# Patient Record
Sex: FEMALE | Race: WHITE | NOT HISPANIC OR LATINO | URBAN - METROPOLITAN AREA
[De-identification: names, ages, dates, MRNs, and addresses within clinical notes are randomized per-mention and may not be internally consistent; named-entity substitution may affect disease eponyms.]

---

## 2017-01-10 ENCOUNTER — FOLLOW UP ESTABLISHED (OUTPATIENT)
Dept: URBAN - METROPOLITAN AREA CLINIC 51 | Facility: CLINIC | Age: 47
End: 2017-01-10
Payer: COMMERCIAL

## 2017-01-10 DIAGNOSIS — H35.342 MACULAR CYST, HOLE, OR PSEUDOHOLE, LEFT EYE: ICD-10-CM

## 2017-01-10 DIAGNOSIS — H25.042 POSTERIOR SUBCAPSULAR POLAR AGE-RELATED CATARACT, LEFT EYE: ICD-10-CM

## 2017-01-10 DIAGNOSIS — H43.822 VITREOMACULAR ADHESION, LEFT EYE: ICD-10-CM

## 2017-01-10 PROCEDURE — 92014 COMPRE OPH EXAM EST PT 1/>: CPT | Performed by: OPTOMETRIST

## 2017-01-10 PROCEDURE — 92134 CPTRZ OPH DX IMG PST SGM RTA: CPT | Performed by: OPTOMETRIST

## 2017-01-10 PROCEDURE — 92133 CPTRZD OPH DX IMG PST SGM ON: CPT | Performed by: OPTOMETRIST

## 2017-01-10 ASSESSMENT — VISUAL ACUITY
OS: 20/40
OD: 20/40

## 2017-01-10 ASSESSMENT — INTRAOCULAR PRESSURE
OD: 16
OS: 14

## 2017-01-10 ASSESSMENT — KERATOMETRY
OS: 44.84
OD: 41.91

## 2017-01-11 ENCOUNTER — Encounter (OUTPATIENT)
Dept: URBAN - METROPOLITAN AREA CLINIC 51 | Facility: CLINIC | Age: 47
End: 2017-01-11
Payer: COMMERCIAL

## 2017-01-11 PROCEDURE — 92083 EXTENDED VISUAL FIELD XM: CPT | Performed by: OPTOMETRIST

## 2017-01-12 ENCOUNTER — FOLLOW UP ESTABLISHED (OUTPATIENT)
Dept: URBAN - METROPOLITAN AREA CLINIC 51 | Facility: CLINIC | Age: 47
End: 2017-01-12
Payer: COMMERCIAL

## 2017-01-12 DIAGNOSIS — H35.413 LATTICE DEGENERATION OF RETINA, BILATERAL: ICD-10-CM

## 2017-01-12 PROCEDURE — 92134 CPTRZ OPH DX IMG PST SGM RTA: CPT | Performed by: OPHTHALMOLOGY

## 2017-01-12 PROCEDURE — 92014 COMPRE OPH EXAM EST PT 1/>: CPT | Performed by: OPHTHALMOLOGY

## 2017-01-12 ASSESSMENT — INTRAOCULAR PRESSURE
OD: 15
OS: 14

## 2017-01-25 ENCOUNTER — Encounter (OUTPATIENT)
Dept: URBAN - METROPOLITAN AREA CLINIC 51 | Facility: CLINIC | Age: 47
End: 2017-01-25
Payer: COMMERCIAL

## 2017-01-25 DIAGNOSIS — H25.12 AGE-RELATED NUCLEAR CATARACT, LEFT EYE: Primary | ICD-10-CM

## 2017-01-25 PROCEDURE — 92025 CPTRIZED CORNEAL TOPOGRAPHY: CPT | Performed by: OPHTHALMOLOGY

## 2017-03-01 ENCOUNTER — FOLLOW UP ESTABLISHED (OUTPATIENT)
Dept: URBAN - METROPOLITAN AREA CLINIC 44 | Facility: CLINIC | Age: 47
End: 2017-03-01
Payer: COMMERCIAL

## 2017-03-01 DIAGNOSIS — H25.813 COMBINED FORMS OF AGE-RELATED CATARACT, BILATERAL: Primary | ICD-10-CM

## 2017-03-01 PROCEDURE — 99213 OFFICE O/P EST LOW 20 MIN: CPT | Performed by: OPHTHALMOLOGY

## 2017-03-01 RX ORDER — DICLOFENAC SODIUM 1 MG/ML
0.1 % SOLUTION/ DROPS OPHTHALMIC
Qty: 2 | Refills: 0 | Status: INACTIVE
Start: 2017-03-01 | End: 2017-06-22

## 2017-03-01 RX ORDER — PREDNISOLONE ACETATE 10 MG/ML
1 % SUSPENSION/ DROPS OPHTHALMIC
Qty: 1 | Refills: 2 | Status: INACTIVE
Start: 2017-03-01 | End: 2017-06-22

## 2017-03-01 RX ORDER — OFLOXACIN 3 MG/ML
0.3 % SOLUTION/ DROPS OPHTHALMIC
Qty: 1 | Refills: 0 | Status: INACTIVE
Start: 2017-03-01 | End: 2017-03-30

## 2017-03-01 ASSESSMENT — INTRAOCULAR PRESSURE
OD: 12
OS: 13

## 2017-03-08 ENCOUNTER — Encounter (OUTPATIENT)
Dept: URBAN - METROPOLITAN AREA CLINIC 51 | Facility: CLINIC | Age: 47
End: 2017-03-08
Payer: COMMERCIAL

## 2017-03-08 DIAGNOSIS — Z01.818 ENCOUNTER FOR OTHER PREPROCEDURAL EXAMINATION: Primary | ICD-10-CM

## 2017-03-08 PROCEDURE — 99213 OFFICE O/P EST LOW 20 MIN: CPT | Performed by: PHYSICIAN ASSISTANT

## 2017-03-22 ENCOUNTER — SURGERY (OUTPATIENT)
Dept: URBAN - METROPOLITAN AREA SURGERY 19 | Facility: SURGERY | Age: 47
End: 2017-03-22
Payer: COMMERCIAL

## 2017-03-22 PROCEDURE — 66984 XCAPSL CTRC RMVL W/O ECP: CPT | Performed by: OPHTHALMOLOGY

## 2017-03-22 PROCEDURE — 65920 REMOVE IMPLANT OF EYE: CPT | Performed by: OPHTHALMOLOGY

## 2017-03-23 ENCOUNTER — POST OP (OUTPATIENT)
Dept: URBAN - METROPOLITAN AREA CLINIC 51 | Facility: CLINIC | Age: 47
End: 2017-03-23

## 2017-03-23 PROCEDURE — 99024 POSTOP FOLLOW-UP VISIT: CPT | Performed by: OPTOMETRIST

## 2017-03-23 ASSESSMENT — INTRAOCULAR PRESSURE: OS: 15

## 2017-03-30 ENCOUNTER — POST OP (OUTPATIENT)
Dept: URBAN - METROPOLITAN AREA CLINIC 51 | Facility: CLINIC | Age: 47
End: 2017-03-30

## 2017-03-30 DIAGNOSIS — Z96.1 PRESENCE OF INTRAOCULAR LENS: Primary | ICD-10-CM

## 2017-03-30 PROCEDURE — 99024 POSTOP FOLLOW-UP VISIT: CPT | Performed by: OPTOMETRIST

## 2017-03-30 RX ORDER — TIMOLOL MALEATE 5 MG/ML
0.5 % SOLUTION/ DROPS OPHTHALMIC
Qty: 15 | Refills: 2 | Status: INACTIVE
Start: 2017-03-30 | End: 2017-04-19

## 2017-03-30 ASSESSMENT — INTRAOCULAR PRESSURE
OD: 12
OS: 15

## 2017-03-30 ASSESSMENT — VISUAL ACUITY
OD: 20/30
OS: 20/20

## 2017-04-19 ENCOUNTER — POST OP (OUTPATIENT)
Dept: URBAN - METROPOLITAN AREA CLINIC 51 | Facility: CLINIC | Age: 47
End: 2017-04-19
Payer: COMMERCIAL

## 2017-04-19 PROCEDURE — 99024 POSTOP FOLLOW-UP VISIT: CPT | Performed by: OPTOMETRIST

## 2017-04-19 RX ORDER — TIMOLOL MALEATE 5 MG/ML
0.5 % SOLUTION/ DROPS OPHTHALMIC
Qty: 15 | Refills: 2 | Status: INACTIVE
Start: 2017-04-19 | End: 2017-06-22

## 2017-04-19 ASSESSMENT — INTRAOCULAR PRESSURE
OS: 20
OD: 19

## 2017-04-19 ASSESSMENT — VISUAL ACUITY
OD: 20/30
OS: 20/20

## 2017-06-22 ENCOUNTER — FOLLOW UP ESTABLISHED (OUTPATIENT)
Dept: URBAN - METROPOLITAN AREA CLINIC 51 | Facility: CLINIC | Age: 47
End: 2017-06-22
Payer: COMMERCIAL

## 2017-06-22 DIAGNOSIS — H35.372 PUCKERING OF MACULA, LEFT EYE: ICD-10-CM

## 2017-06-22 DIAGNOSIS — H26.492 OTHER SECONDARY CATARACT, LEFT EYE: Primary | ICD-10-CM

## 2017-06-22 PROCEDURE — 92014 COMPRE OPH EXAM EST PT 1/>: CPT | Performed by: OPTOMETRIST

## 2017-06-22 PROCEDURE — 92134 CPTRZ OPH DX IMG PST SGM RTA: CPT | Performed by: OPTOMETRIST

## 2017-06-22 RX ORDER — TIMOLOL MALEATE 5 MG/ML
0.5 % SOLUTION/ DROPS OPHTHALMIC
Qty: 15 | Refills: 2 | Status: INACTIVE
Start: 2017-06-22 | End: 2017-09-05

## 2017-06-22 ASSESSMENT — VISUAL ACUITY
OD: 20/30
OS: 20/20

## 2017-06-22 ASSESSMENT — INTRAOCULAR PRESSURE
OD: 21
OS: 16

## 2017-07-19 ENCOUNTER — Encounter (OUTPATIENT)
Dept: URBAN - METROPOLITAN AREA CLINIC 51 | Facility: CLINIC | Age: 47
End: 2017-07-19
Payer: COMMERCIAL

## 2017-07-19 PROCEDURE — 99213 OFFICE O/P EST LOW 20 MIN: CPT | Performed by: PHYSICIAN ASSISTANT

## 2017-07-26 ENCOUNTER — SURGERY (OUTPATIENT)
Dept: URBAN - METROPOLITAN AREA SURGERY 19 | Facility: SURGERY | Age: 47
End: 2017-07-26
Payer: COMMERCIAL

## 2017-07-26 PROCEDURE — 66821 AFTER CATARACT LASER SURGERY: CPT | Performed by: OPHTHALMOLOGY

## 2017-07-27 ENCOUNTER — POST OP (OUTPATIENT)
Dept: URBAN - METROPOLITAN AREA CLINIC 51 | Facility: CLINIC | Age: 47
End: 2017-07-27
Payer: COMMERCIAL

## 2017-07-27 PROCEDURE — 99024 POSTOP FOLLOW-UP VISIT: CPT | Performed by: OPTOMETRIST

## 2017-07-27 ASSESSMENT — INTRAOCULAR PRESSURE
OD: 17
OS: 11

## 2018-09-06 ENCOUNTER — CONSULT (OUTPATIENT)
Dept: URBAN - METROPOLITAN AREA CLINIC 51 | Facility: CLINIC | Age: 48
End: 2018-09-06
Payer: COMMERCIAL

## 2018-09-06 DIAGNOSIS — D48.5 NEOPLASM OF UNCERTIAN BEHAVIOR OF SKIN: Primary | ICD-10-CM

## 2018-09-06 PROCEDURE — 11101 BX SKIN/SUBQ TISS (SEP PRO); E: CPT | Performed by: OPHTHALMOLOGY

## 2018-09-06 PROCEDURE — 99202 OFFICE O/P NEW SF 15 MIN: CPT | Performed by: OPHTHALMOLOGY

## 2018-09-06 PROCEDURE — 92285 EXTERNAL OCULAR PHOTOGRAPHY: CPT | Performed by: OPHTHALMOLOGY

## 2018-09-06 PROCEDURE — 11100 SKIN BIOPSY: CPT | Performed by: OPHTHALMOLOGY

## 2018-09-06 PROCEDURE — 99212 OFFICE O/P EST SF 10 MIN: CPT | Performed by: OPHTHALMOLOGY

## 2018-09-21 ENCOUNTER — FOLLOW UP ESTABLISHED (OUTPATIENT)
Dept: URBAN - METROPOLITAN AREA CLINIC 51 | Facility: CLINIC | Age: 48
End: 2018-09-21
Payer: COMMERCIAL

## 2018-09-21 PROCEDURE — 92014 COMPRE OPH EXAM EST PT 1/>: CPT | Performed by: OPTOMETRIST

## 2018-09-21 PROCEDURE — 92134 CPTRZ OPH DX IMG PST SGM RTA: CPT | Performed by: OPTOMETRIST

## 2018-09-21 RX ORDER — TIMOLOL MALEATE 5 MG/ML
0.5 % SOLUTION/ DROPS OPHTHALMIC
Qty: 3 | Refills: 3 | Status: INACTIVE
Start: 2018-09-21 | End: 2018-11-19

## 2018-09-21 RX ORDER — DOXYCYCLINE HYCLATE 50 MG/1
50 MG CAPSULE, GELATIN COATED ORAL
Qty: 30 | Refills: 2 | Status: INACTIVE
Start: 2018-09-21 | End: 2019-05-09

## 2018-09-21 ASSESSMENT — VISUAL ACUITY
OS: 20/20
OD: 20/30

## 2018-09-21 ASSESSMENT — INTRAOCULAR PRESSURE
OS: 19
OD: 23
OS: 18
OD: 22

## 2018-11-01 ENCOUNTER — FOLLOW UP ESTABLISHED (OUTPATIENT)
Dept: URBAN - METROPOLITAN AREA CLINIC 51 | Facility: CLINIC | Age: 48
End: 2018-11-01
Payer: COMMERCIAL

## 2018-11-01 PROCEDURE — 92012 INTRM OPH EXAM EST PATIENT: CPT | Performed by: OPTOMETRIST

## 2018-11-01 ASSESSMENT — INTRAOCULAR PRESSURE
OS: 17
OD: 20

## 2018-11-29 ENCOUNTER — FOLLOW UP ESTABLISHED (OUTPATIENT)
Dept: URBAN - METROPOLITAN AREA CLINIC 51 | Facility: CLINIC | Age: 48
End: 2018-11-29
Payer: COMMERCIAL

## 2018-11-29 PROCEDURE — 99213 OFFICE O/P EST LOW 20 MIN: CPT | Performed by: OPTOMETRIST

## 2019-02-20 ENCOUNTER — APPOINTMENT (RX ONLY)
Dept: URBAN - METROPOLITAN AREA CLINIC 176 | Facility: CLINIC | Age: 49
Setting detail: DERMATOLOGY
End: 2019-02-20

## 2019-02-20 DIAGNOSIS — L91.8 OTHER HYPERTROPHIC DISORDERS OF THE SKIN: ICD-10-CM

## 2019-02-20 DIAGNOSIS — Z80.8 FAMILY HISTORY OF MALIGNANT NEOPLASM OF OTHER ORGANS OR SYSTEMS: ICD-10-CM

## 2019-02-20 DIAGNOSIS — L29.89 OTHER PRURITUS: ICD-10-CM

## 2019-02-20 DIAGNOSIS — L85.3 XEROSIS CUTIS: ICD-10-CM

## 2019-02-20 DIAGNOSIS — L82.1 OTHER SEBORRHEIC KERATOSIS: ICD-10-CM

## 2019-02-20 DIAGNOSIS — L57.0 ACTINIC KERATOSIS: ICD-10-CM

## 2019-02-20 DIAGNOSIS — L30.1 DYSHIDROSIS [POMPHOLYX]: ICD-10-CM

## 2019-02-20 DIAGNOSIS — L57.8 OTHER SKIN CHANGES DUE TO CHRONIC EXPOSURE TO NONIONIZING RADIATION: ICD-10-CM

## 2019-02-20 DIAGNOSIS — D18.0 HEMANGIOMA: ICD-10-CM

## 2019-02-20 DIAGNOSIS — D22 MELANOCYTIC NEVI: ICD-10-CM

## 2019-02-20 DIAGNOSIS — L29.8 OTHER PRURITUS: ICD-10-CM

## 2019-02-20 DIAGNOSIS — L20.89 OTHER ATOPIC DERMATITIS: ICD-10-CM

## 2019-02-20 PROBLEM — L20.84 INTRINSIC (ALLERGIC) ECZEMA: Status: ACTIVE | Noted: 2019-02-20

## 2019-02-20 PROBLEM — D22.62 MELANOCYTIC NEVI OF LEFT UPPER LIMB, INCLUDING SHOULDER: Status: ACTIVE | Noted: 2019-02-20

## 2019-02-20 PROBLEM — D22.5 MELANOCYTIC NEVI OF TRUNK: Status: ACTIVE | Noted: 2019-02-20

## 2019-02-20 PROBLEM — D18.01 HEMANGIOMA OF SKIN AND SUBCUTANEOUS TISSUE: Status: ACTIVE | Noted: 2019-02-20

## 2019-02-20 PROBLEM — E03.9 HYPOTHYROIDISM, UNSPECIFIED: Status: ACTIVE | Noted: 2019-02-20

## 2019-02-20 PROBLEM — D22.72 MELANOCYTIC NEVI OF LEFT LOWER LIMB, INCLUDING HIP: Status: ACTIVE | Noted: 2019-02-20

## 2019-02-20 PROCEDURE — ? PRESCRIPTION SAMPLES PROVIDED

## 2019-02-20 PROCEDURE — ? ADDITIONAL NOTES

## 2019-02-20 PROCEDURE — ? LIQUID NITROGEN

## 2019-02-20 PROCEDURE — 99214 OFFICE O/P EST MOD 30 MIN: CPT | Mod: 25

## 2019-02-20 PROCEDURE — 17000 DESTRUCT PREMALG LESION: CPT

## 2019-02-20 PROCEDURE — 17003 DESTRUCT PREMALG LES 2-14: CPT

## 2019-02-20 PROCEDURE — ? PRESCRIPTION

## 2019-02-20 PROCEDURE — ? COUNSELING

## 2019-02-20 RX ORDER — TRIAMCINOLONE ACETONIDE 1 MG/G
CREAM TOPICAL
Qty: 2 | Refills: 3 | Status: ERX | COMMUNITY
Start: 2019-02-20

## 2019-02-20 RX ADMIN — TRIAMCINOLONE ACETONIDE 1: 1 CREAM TOPICAL at 00:00

## 2019-02-20 ASSESSMENT — LOCATION SIMPLE DESCRIPTION DERM
LOCATION SIMPLE: LEFT THIGH
LOCATION SIMPLE: LEFT ANKLE
LOCATION SIMPLE: LEFT FOREHEAD
LOCATION SIMPLE: RIGHT UPPER ARM
LOCATION SIMPLE: LEFT CLAVICULAR SKIN
LOCATION SIMPLE: LEFT SHOULDER
LOCATION SIMPLE: LEFT UPPER ARM
LOCATION SIMPLE: RIGHT HAND
LOCATION SIMPLE: LEFT ANTERIOR NECK
LOCATION SIMPLE: CHEST
LOCATION SIMPLE: LEFT HAND
LOCATION SIMPLE: UPPER BACK
LOCATION SIMPLE: RIGHT UPPER BACK
LOCATION SIMPLE: RIGHT FOREARM
LOCATION SIMPLE: RIGHT ANTERIOR NECK
LOCATION SIMPLE: ABDOMEN
LOCATION SIMPLE: RIGHT CLAVICULAR SKIN

## 2019-02-20 ASSESSMENT — LOCATION ZONE DERM
LOCATION ZONE: LEG
LOCATION ZONE: NECK
LOCATION ZONE: ARM
LOCATION ZONE: HAND
LOCATION ZONE: TRUNK
LOCATION ZONE: FACE

## 2019-02-20 ASSESSMENT — LOCATION DETAILED DESCRIPTION DERM
LOCATION DETAILED: LEFT RADIAL DORSAL HAND
LOCATION DETAILED: LEFT ANKLE
LOCATION DETAILED: RIGHT CLAVICULAR NECK
LOCATION DETAILED: LEFT CLAVICULAR SKIN
LOCATION DETAILED: LEFT ANTERIOR PROXIMAL THIGH
LOCATION DETAILED: MIDDLE STERNUM
LOCATION DETAILED: LEFT SUPERIOR FOREHEAD
LOCATION DETAILED: LEFT ANTERIOR PROXIMAL UPPER ARM
LOCATION DETAILED: RIGHT RADIAL DORSAL HAND
LOCATION DETAILED: RIGHT SUPERIOR MEDIAL UPPER BACK
LOCATION DETAILED: RIGHT LATERAL ABDOMEN
LOCATION DETAILED: RIGHT VENTRAL PROXIMAL FOREARM
LOCATION DETAILED: RIGHT ANTECUBITAL SKIN
LOCATION DETAILED: LEFT CLAVICULAR NECK
LOCATION DETAILED: LEFT POSTERIOR SHOULDER
LOCATION DETAILED: LEFT RIB CAGE
LOCATION DETAILED: SUPERIOR THORACIC SPINE
LOCATION DETAILED: RIGHT CLAVICULAR SKIN
LOCATION DETAILED: LEFT ANTERIOR DISTAL UPPER ARM

## 2019-02-20 NOTE — PROCEDURE: ADDITIONAL NOTES
Detail Level: Detailed
Additional Notes: Use Triamcinolone when area starts to itch (has used betamethasone in past from TM).

## 2019-05-09 ENCOUNTER — FOLLOW UP ESTABLISHED (OUTPATIENT)
Dept: URBAN - METROPOLITAN AREA CLINIC 51 | Facility: CLINIC | Age: 49
End: 2019-05-09
Payer: COMMERCIAL

## 2019-05-09 PROCEDURE — 92012 INTRM OPH EXAM EST PATIENT: CPT | Performed by: OPTOMETRIST

## 2019-05-09 RX ORDER — TIMOLOL MALEATE 5 MG/ML
0.5 % SOLUTION/ DROPS OPHTHALMIC
Qty: 3 | Refills: 3 | Status: INACTIVE
Start: 2019-05-09 | End: 2019-09-17

## 2019-05-09 ASSESSMENT — INTRAOCULAR PRESSURE
OD: 18
OS: 17

## 2019-09-06 ENCOUNTER — FOLLOW UP ESTABLISHED (OUTPATIENT)
Dept: URBAN - METROPOLITAN AREA CLINIC 51 | Facility: CLINIC | Age: 49
End: 2019-09-06
Payer: COMMERCIAL

## 2019-09-06 PROCEDURE — 92014 COMPRE OPH EXAM EST PT 1/>: CPT | Performed by: OPTOMETRIST

## 2019-09-06 PROCEDURE — 92133 CPTRZD OPH DX IMG PST SGM ON: CPT | Performed by: OPTOMETRIST

## 2019-09-06 RX ORDER — RANITIDINE HCL 150 MG
150 MG TABLET ORAL
Qty: 0 | Refills: 0 | Status: ACTIVE
Start: 2019-09-06

## 2019-09-06 ASSESSMENT — INTRAOCULAR PRESSURE
OD: 20
OS: 17

## 2019-09-06 ASSESSMENT — VISUAL ACUITY
OS: 20/20
OD: 20/30

## 2019-09-17 ENCOUNTER — FOLLOW UP ESTABLISHED (OUTPATIENT)
Dept: URBAN - METROPOLITAN AREA CLINIC 51 | Facility: CLINIC | Age: 49
End: 2019-09-17
Payer: COMMERCIAL

## 2019-09-17 DIAGNOSIS — H40.011 OPEN ANGLE WITH BORDERLINE FINDINGS, LOW RISK, RIGHT EYE: Primary | ICD-10-CM

## 2019-09-17 PROCEDURE — 92012 INTRM OPH EXAM EST PATIENT: CPT | Performed by: OPTOMETRIST

## 2019-09-17 RX ORDER — TIMOLOL MALEATE 5 MG/ML
0.5 % SOLUTION/ DROPS OPHTHALMIC
Qty: 3 | Refills: 3 | Status: INACTIVE
Start: 2019-09-17 | End: 2020-02-10

## 2019-09-17 ASSESSMENT — INTRAOCULAR PRESSURE
OS: 18
OD: 19

## 2020-02-20 ENCOUNTER — APPOINTMENT (RX ONLY)
Dept: URBAN - METROPOLITAN AREA CLINIC 176 | Facility: CLINIC | Age: 50
Setting detail: DERMATOLOGY
End: 2020-02-20

## 2020-02-20 VITALS — HEIGHT: 67 IN | WEIGHT: 204 LBS

## 2020-02-20 DIAGNOSIS — L82.1 OTHER SEBORRHEIC KERATOSIS: ICD-10-CM

## 2020-02-20 DIAGNOSIS — L85.3 XEROSIS CUTIS: ICD-10-CM

## 2020-02-20 DIAGNOSIS — L57.8 OTHER SKIN CHANGES DUE TO CHRONIC EXPOSURE TO NONIONIZING RADIATION: ICD-10-CM

## 2020-02-20 DIAGNOSIS — D18.0 HEMANGIOMA: ICD-10-CM

## 2020-02-20 DIAGNOSIS — L91.8 OTHER HYPERTROPHIC DISORDERS OF THE SKIN: ICD-10-CM

## 2020-02-20 DIAGNOSIS — L30.1 DYSHIDROSIS [POMPHOLYX]: ICD-10-CM

## 2020-02-20 DIAGNOSIS — L20.89 OTHER ATOPIC DERMATITIS: ICD-10-CM

## 2020-02-20 DIAGNOSIS — Z80.8 FAMILY HISTORY OF MALIGNANT NEOPLASM OF OTHER ORGANS OR SYSTEMS: ICD-10-CM

## 2020-02-20 DIAGNOSIS — L71.8 OTHER ROSACEA: ICD-10-CM | Status: INADEQUATELY CONTROLLED

## 2020-02-20 DIAGNOSIS — D22 MELANOCYTIC NEVI: ICD-10-CM

## 2020-02-20 PROBLEM — D18.01 HEMANGIOMA OF SKIN AND SUBCUTANEOUS TISSUE: Status: ACTIVE | Noted: 2020-02-20

## 2020-02-20 PROBLEM — D22.72 MELANOCYTIC NEVI OF LEFT LOWER LIMB, INCLUDING HIP: Status: ACTIVE | Noted: 2020-02-20

## 2020-02-20 PROBLEM — L20.84 INTRINSIC (ALLERGIC) ECZEMA: Status: ACTIVE | Noted: 2020-02-20

## 2020-02-20 PROBLEM — D22.62 MELANOCYTIC NEVI OF LEFT UPPER LIMB, INCLUDING SHOULDER: Status: ACTIVE | Noted: 2020-02-20

## 2020-02-20 PROBLEM — D22.5 MELANOCYTIC NEVI OF TRUNK: Status: ACTIVE | Noted: 2020-02-20

## 2020-02-20 PROBLEM — D22.71 MELANOCYTIC NEVI OF RIGHT LOWER LIMB, INCLUDING HIP: Status: ACTIVE | Noted: 2020-02-20

## 2020-02-20 PROBLEM — D23.72 OTHER BENIGN NEOPLASM OF SKIN OF LEFT LOWER LIMB, INCLUDING HIP: Status: ACTIVE | Noted: 2020-02-20

## 2020-02-20 PROCEDURE — 99214 OFFICE O/P EST MOD 30 MIN: CPT

## 2020-02-20 PROCEDURE — ? COUNSELING

## 2020-02-20 PROCEDURE — ? PRESCRIPTION

## 2020-02-20 PROCEDURE — ? TREATMENT REGIMEN

## 2020-02-20 RX ORDER — IVERMECTIN 10 MG/G
CREAM TOPICAL
Qty: 1 | Refills: 2 | Status: ERX | COMMUNITY
Start: 2020-02-20

## 2020-02-20 RX ADMIN — IVERMECTIN: 10 CREAM TOPICAL at 00:00

## 2020-02-20 ASSESSMENT — LOCATION SIMPLE DESCRIPTION DERM
LOCATION SIMPLE: LEFT POSTERIOR THIGH
LOCATION SIMPLE: RIGHT HAND
LOCATION SIMPLE: LEFT UPPER ARM
LOCATION SIMPLE: RIGHT CLAVICULAR SKIN
LOCATION SIMPLE: RIGHT FOREARM
LOCATION SIMPLE: LEFT CHEEK
LOCATION SIMPLE: LEFT CLAVICULAR SKIN
LOCATION SIMPLE: RIGHT ANTERIOR NECK
LOCATION SIMPLE: LEFT LOWER BACK
LOCATION SIMPLE: LEFT SHOULDER
LOCATION SIMPLE: RIGHT POSTERIOR THIGH
LOCATION SIMPLE: CHEST
LOCATION SIMPLE: ABDOMEN
LOCATION SIMPLE: LEFT HAND
LOCATION SIMPLE: LEFT ANTERIOR NECK
LOCATION SIMPLE: LEFT ANKLE
LOCATION SIMPLE: UPPER BACK
LOCATION SIMPLE: LEFT THIGH

## 2020-02-20 ASSESSMENT — LOCATION ZONE DERM
LOCATION ZONE: LEG
LOCATION ZONE: NECK
LOCATION ZONE: FACE
LOCATION ZONE: HAND
LOCATION ZONE: ARM
LOCATION ZONE: TRUNK

## 2020-02-20 ASSESSMENT — LOCATION DETAILED DESCRIPTION DERM
LOCATION DETAILED: LEFT ANTERIOR DISTAL UPPER ARM
LOCATION DETAILED: LEFT CENTRAL MALAR CHEEK
LOCATION DETAILED: LEFT CLAVICULAR SKIN
LOCATION DETAILED: RIGHT VENTRAL PROXIMAL FOREARM
LOCATION DETAILED: LEFT RADIAL DORSAL HAND
LOCATION DETAILED: LEFT DISTAL POSTERIOR THIGH
LOCATION DETAILED: RIGHT DISTAL POSTERIOR THIGH
LOCATION DETAILED: SUPERIOR THORACIC SPINE
LOCATION DETAILED: RIGHT CLAVICULAR NECK
LOCATION DETAILED: RIGHT LATERAL ABDOMEN
LOCATION DETAILED: LEFT ANTERIOR PROXIMAL UPPER ARM
LOCATION DETAILED: RIGHT CLAVICULAR SKIN
LOCATION DETAILED: LEFT RIB CAGE
LOCATION DETAILED: LEFT POSTERIOR SHOULDER
LOCATION DETAILED: LEFT ANTERIOR PROXIMAL THIGH
LOCATION DETAILED: MIDDLE STERNUM
LOCATION DETAILED: LEFT CLAVICULAR NECK
LOCATION DETAILED: RIGHT RADIAL DORSAL HAND
LOCATION DETAILED: LEFT SUPERIOR MEDIAL MIDBACK
LOCATION DETAILED: LEFT ANKLE

## 2020-02-20 ASSESSMENT — SEVERITY ASSESSMENT OVERALL AMONG ALL PATIENTS: IN YOUR EXPERIENCE, AMONG ALL PATIENTS YOU HAVE SEEN WITH THIS CONDITION, HOW SEVERE IS THIS PATIENT'S CONDITION?: MILD

## 2020-02-20 NOTE — PROCEDURE: TREATMENT REGIMEN
Detail Level: Zone
Plan: Patient being treated by her doctor she works with - she takes doxy as needed
Samples Given: Soolantra apply to face qam

## 2020-02-20 NOTE — PROCEDURE: MIPS QUALITY
Detail Level: Detailed
Quality 110: Preventive Care And Screening: Influenza Immunization: Influenza Immunization Administered during Influenza season
Quality 130: Documentation Of Current Medications In The Medical Record: Current Medications Documented
Quality 474: Zoster Vaccination Status: Shingrix Vaccination not Administered or Previously Received, Reason not Otherwise Specified

## 2020-05-01 ENCOUNTER — FOLLOW UP ESTABLISHED (OUTPATIENT)
Dept: URBAN - METROPOLITAN AREA CLINIC 51 | Facility: CLINIC | Age: 50
End: 2020-05-01
Payer: COMMERCIAL

## 2020-05-01 PROCEDURE — 92012 INTRM OPH EXAM EST PATIENT: CPT | Performed by: OPTOMETRIST

## 2020-05-04 ENCOUNTER — FOLLOW UP ESTABLISHED (OUTPATIENT)
Dept: URBAN - METROPOLITAN AREA CLINIC 51 | Facility: CLINIC | Age: 50
End: 2020-05-04
Payer: COMMERCIAL

## 2020-05-04 DIAGNOSIS — S05.02XA INJ CONJUNCTIVA AND CORNEAL ABRASION W/O FB, LEFT EYE, INIT: Primary | ICD-10-CM

## 2020-05-04 PROCEDURE — 92012 INTRM OPH EXAM EST PATIENT: CPT | Performed by: OPTOMETRIST

## 2020-07-31 ENCOUNTER — FOLLOW UP ESTABLISHED (OUTPATIENT)
Dept: URBAN - METROPOLITAN AREA CLINIC 51 | Facility: CLINIC | Age: 50
End: 2020-07-31
Payer: COMMERCIAL

## 2020-07-31 DIAGNOSIS — H52.4 PRESBYOPIA: ICD-10-CM

## 2020-07-31 DIAGNOSIS — Z98.890 OTHER SPECIFIED POSTPROCEDURAL STATES: ICD-10-CM

## 2020-07-31 DIAGNOSIS — H04.123 DRY EYE SYNDROME OF BILATERAL LACRIMAL GLANDS: ICD-10-CM

## 2020-07-31 PROCEDURE — 92014 COMPRE OPH EXAM EST PT 1/>: CPT | Performed by: OPTOMETRIST

## 2020-07-31 PROCEDURE — 92134 CPTRZ OPH DX IMG PST SGM RTA: CPT | Performed by: OPTOMETRIST

## 2020-07-31 PROCEDURE — 92015 DETERMINE REFRACTIVE STATE: CPT | Performed by: OPTOMETRIST

## 2020-07-31 ASSESSMENT — VISUAL ACUITY
OS: 20/20
OD: 20/30

## 2020-07-31 ASSESSMENT — KERATOMETRY
OD: 41.92
OS: 44.77

## 2020-07-31 ASSESSMENT — INTRAOCULAR PRESSURE
OD: 21
OS: 18

## 2020-12-04 ENCOUNTER — FOLLOW UP ESTABLISHED (OUTPATIENT)
Dept: URBAN - METROPOLITAN AREA CLINIC 51 | Facility: CLINIC | Age: 50
End: 2020-12-04
Payer: COMMERCIAL

## 2020-12-04 PROCEDURE — 92012 INTRM OPH EXAM EST PATIENT: CPT | Performed by: OPTOMETRIST

## 2020-12-04 ASSESSMENT — INTRAOCULAR PRESSURE
OD: 22
OS: 18
OS: 14
OD: 21

## 2021-03-18 ENCOUNTER — APPOINTMENT (RX ONLY)
Dept: URBAN - METROPOLITAN AREA CLINIC 176 | Facility: CLINIC | Age: 51
Setting detail: DERMATOLOGY
End: 2021-03-18

## 2021-03-18 VITALS — WEIGHT: 120 LBS | HEIGHT: 67 IN

## 2021-03-18 DIAGNOSIS — Z80.8 FAMILY HISTORY OF MALIGNANT NEOPLASM OF OTHER ORGANS OR SYSTEMS: ICD-10-CM

## 2021-03-18 DIAGNOSIS — L82.0 INFLAMED SEBORRHEIC KERATOSIS: ICD-10-CM

## 2021-03-18 DIAGNOSIS — L82.1 OTHER SEBORRHEIC KERATOSIS: ICD-10-CM

## 2021-03-18 DIAGNOSIS — L57.8 OTHER SKIN CHANGES DUE TO CHRONIC EXPOSURE TO NONIONIZING RADIATION: ICD-10-CM

## 2021-03-18 DIAGNOSIS — D22 MELANOCYTIC NEVI: ICD-10-CM

## 2021-03-18 DIAGNOSIS — L30.1 DYSHIDROSIS [POMPHOLYX]: ICD-10-CM

## 2021-03-18 DIAGNOSIS — L57.0 ACTINIC KERATOSIS: ICD-10-CM | Status: INADEQUATELY CONTROLLED

## 2021-03-18 DIAGNOSIS — D18.0 HEMANGIOMA: ICD-10-CM

## 2021-03-18 DIAGNOSIS — L20.89 OTHER ATOPIC DERMATITIS: ICD-10-CM

## 2021-03-18 DIAGNOSIS — L71.8 OTHER ROSACEA: ICD-10-CM

## 2021-03-18 PROBLEM — D22.5 MELANOCYTIC NEVI OF TRUNK: Status: ACTIVE | Noted: 2021-03-18

## 2021-03-18 PROBLEM — D22.62 MELANOCYTIC NEVI OF LEFT UPPER LIMB, INCLUDING SHOULDER: Status: ACTIVE | Noted: 2021-03-18

## 2021-03-18 PROBLEM — D18.01 HEMANGIOMA OF SKIN AND SUBCUTANEOUS TISSUE: Status: ACTIVE | Noted: 2021-03-18

## 2021-03-18 PROBLEM — D22.72 MELANOCYTIC NEVI OF LEFT LOWER LIMB, INCLUDING HIP: Status: ACTIVE | Noted: 2021-03-18

## 2021-03-18 PROBLEM — D22.71 MELANOCYTIC NEVI OF RIGHT LOWER LIMB, INCLUDING HIP: Status: ACTIVE | Noted: 2021-03-18

## 2021-03-18 PROBLEM — D23.72 OTHER BENIGN NEOPLASM OF SKIN OF LEFT LOWER LIMB, INCLUDING HIP: Status: ACTIVE | Noted: 2021-03-18

## 2021-03-18 PROBLEM — L20.84 INTRINSIC (ALLERGIC) ECZEMA: Status: ACTIVE | Noted: 2021-03-18

## 2021-03-18 PROCEDURE — 99214 OFFICE O/P EST MOD 30 MIN: CPT | Mod: 25

## 2021-03-18 PROCEDURE — ? BENIGN DESTRUCTION

## 2021-03-18 PROCEDURE — 17111 DESTRUCTION B9 LESIONS 15/>: CPT

## 2021-03-18 PROCEDURE — ? PRESCRIPTION

## 2021-03-18 PROCEDURE — ? TREATMENT REGIMEN

## 2021-03-18 PROCEDURE — ? COUNSELING

## 2021-03-18 RX ORDER — IVERMECTIN 10 MG/G
CREAM TOPICAL
Qty: 1 | Refills: 2 | Status: ERX

## 2021-03-18 RX ORDER — FLUOROURACIL 2 G/40G
CREAM TOPICAL
Qty: 1 | Refills: 0 | Status: ERX | COMMUNITY
Start: 2021-03-18

## 2021-03-18 RX ADMIN — FLUOROURACIL: 2 CREAM TOPICAL at 00:00

## 2021-03-18 ASSESSMENT — LOCATION SIMPLE DESCRIPTION DERM
LOCATION SIMPLE: RIGHT HAND
LOCATION SIMPLE: LEFT FOREHEAD
LOCATION SIMPLE: LEFT CALF
LOCATION SIMPLE: RIGHT BREAST
LOCATION SIMPLE: RIGHT UPPER BACK
LOCATION SIMPLE: LEFT ANKLE
LOCATION SIMPLE: LEFT LOWER BACK
LOCATION SIMPLE: LEFT HAND
LOCATION SIMPLE: LOWER BACK
LOCATION SIMPLE: CHEST
LOCATION SIMPLE: LEFT THIGH
LOCATION SIMPLE: LEFT UPPER ARM
LOCATION SIMPLE: ABDOMEN
LOCATION SIMPLE: LEFT POSTERIOR THIGH
LOCATION SIMPLE: RIGHT POSTERIOR THIGH
LOCATION SIMPLE: LEFT BREAST
LOCATION SIMPLE: LEFT CHEEK
LOCATION SIMPLE: NOSE

## 2021-03-18 ASSESSMENT — LOCATION DETAILED DESCRIPTION DERM
LOCATION DETAILED: LEFT FOREHEAD
LOCATION DETAILED: SUBXIPHOID
LOCATION DETAILED: LEFT CENTRAL MALAR CHEEK
LOCATION DETAILED: RIGHT MEDIAL BREAST 4-5:00 REGION
LOCATION DETAILED: LEFT RADIAL DORSAL HAND
LOCATION DETAILED: LEFT ANTERIOR PROXIMAL THIGH
LOCATION DETAILED: LEFT ANKLE
LOCATION DETAILED: LEFT ANTERIOR DISTAL UPPER ARM
LOCATION DETAILED: RIGHT INFRAMAMMARY CREASE (OUTER QUADRANT)
LOCATION DETAILED: RIGHT DISTAL POSTERIOR THIGH
LOCATION DETAILED: MIDDLE STERNUM
LOCATION DETAILED: RIGHT LATERAL ABDOMEN
LOCATION DETAILED: LEFT PROXIMAL CALF
LOCATION DETAILED: RIGHT INFERIOR MEDIAL UPPER BACK
LOCATION DETAILED: LEFT SUPERIOR MEDIAL MIDBACK
LOCATION DETAILED: RIGHT INFRAMAMMARY CREASE (INNER QUADRANT)
LOCATION DETAILED: RIGHT INFERIOR LATERAL UPPER BACK
LOCATION DETAILED: RIGHT MID-UPPER BACK
LOCATION DETAILED: SUPERIOR LUMBAR SPINE
LOCATION DETAILED: LEFT MEDIAL BREAST 6-7:00 REGION
LOCATION DETAILED: LEFT MEDIAL BREAST 7-8:00 REGION
LOCATION DETAILED: EPIGASTRIC SKIN
LOCATION DETAILED: LEFT ANTERIOR PROXIMAL UPPER ARM
LOCATION DETAILED: LEFT INFRAMAMMARY CREASE (INNER QUADRANT)
LOCATION DETAILED: NASAL ROOT
LOCATION DETAILED: LEFT DISTAL POSTERIOR THIGH
LOCATION DETAILED: RIGHT RADIAL DORSAL HAND
LOCATION DETAILED: XIPHOID
LOCATION DETAILED: RIGHT RIB CAGE
LOCATION DETAILED: LEFT INFERIOR CENTRAL MALAR CHEEK
LOCATION DETAILED: LEFT RIB CAGE

## 2021-03-18 ASSESSMENT — LOCATION ZONE DERM
LOCATION ZONE: NOSE
LOCATION ZONE: TRUNK
LOCATION ZONE: HAND
LOCATION ZONE: LEG
LOCATION ZONE: FACE
LOCATION ZONE: ARM

## 2021-03-18 NOTE — PROCEDURE: BENIGN DESTRUCTION
Treatment Number (Will Not Render If 0): 0
Anesthesia Volume In Cc: 0.5
Detail Level: Detailed
Post-Care Instructions: I reviewed with the patient in detail post-care instructions. Patient is to wear sunprotection, and avoid picking at any of the treated lesions. Pt may apply Vaseline to crusted or scabbing areas.
Include Z78.9 (Other Specified Conditions Influencing Health Status) As An Associated Diagnosis?: No
Medical Necessity Clause: This procedure was medically necessary because the lesions that were treated were:
Consent: The patient's consent was obtained including but not limited to risks of crusting, scabbing, blistering, scarring, darker or lighter pigmentary change, recurrence, incomplete removal and infection.
Medical Necessity Information: It is in your best interest to select a reason for this procedure from the list below. All of these items fulfill various CMS LCD requirements except the new and changing color options.

## 2021-03-18 NOTE — PROCEDURE: TREATMENT REGIMEN
Detail Level: Zone
Plan: Patient being treated by her doctor she works with - she takes doxy as needed

## 2021-03-18 NOTE — PROCEDURE: COUNSELING
Detail Level: Zone
Detail Level: Detailed
Detail Level: Simple
Laser Recommendations: Could consider vbeam
Topical Retinoids Recommendations: Could consider in future but may be more irritating.
Moisturizer Recommendations: Cont current
Moisturizer Recommendations: CERAVE
Detail Level: Generalized
Topical Retinoids Recommendations: REFILLED REFISSA
Patient Specific Counseling (Will Not Stick From Patient To Patient): GAVE HANDOUT- WILL CALL IF SHE HAS ANY OTHER QUESTIONS

## 2021-04-08 ENCOUNTER — OFFICE VISIT (OUTPATIENT)
Dept: URBAN - METROPOLITAN AREA CLINIC 51 | Facility: CLINIC | Age: 51
End: 2021-04-08
Payer: COMMERCIAL

## 2021-04-08 DIAGNOSIS — H01.119 ALLERGIC DERMATITIS OF EYELID: ICD-10-CM

## 2021-04-08 PROCEDURE — 99214 OFFICE O/P EST MOD 30 MIN: CPT | Performed by: OPTOMETRIST

## 2021-04-08 RX ORDER — BRIMONIDINE TARTRATE, TIMOLOL MALEATE 2; 5 MG/ML; MG/ML
SOLUTION/ DROPS OPHTHALMIC
Qty: 0 | Refills: 0 | Status: INACTIVE
Start: 2021-04-08 | End: 2021-04-29

## 2021-04-08 RX ORDER — ERYTHROMYCIN 5 MG/G
OINTMENT OPHTHALMIC
Qty: 3.5 | Refills: 1 | Status: INACTIVE
Start: 2021-04-08 | End: 2021-12-03

## 2021-04-08 ASSESSMENT — INTRAOCULAR PRESSURE
OD: 26
OS: 18

## 2021-04-08 NOTE — IMPRESSION/PLAN
Impression: Allergic dermatitis of eyelid: H01.119. *lichenification, pt reports having to rub eyes more often with increased use of ATs. Plan: Start Emycin Jina BID rubbing to affected site.  She also has Rosacea

## 2021-04-08 NOTE — IMPRESSION/PLAN
Impression: Open angle with borderline findings, low risk, right eye *IOPs today 26mmHg OD and 18mmHg OS with Timolol 0.50% BID OD. reports good adherence *Hx of S/P Retinal sx OD w/ IOP rise, using med as preventive. *PT has SULFA ALLERGY/LUNG DZ *Tmax = 35 *OCT RNFL (6/10/14) OD: 92um; no thinning OS: 95um; no thinning *OCT RNFL (1/12/16): OD: 73um; thinning superior/nasal/inferior OS: 77um; thinning superior *OCT RNFL (1/10/17): OD: 90 um; borderline thinning inferior OS: 87um; borderline inf thinning *HVF 24-2 (12/4/14): OD: high FL 3/11xx with superior lens edge defect OS: high FL 4/11xx; scattered defects *HVF 24-2 (1/11/17): OD: superior lid/lens defect, stable OS: high FL 4/11xx; scattered binasal defect Plan: The glaucoma is not managed well with current gtt regimen. Pt to switch to Combigan BID OU instead of Timolol. She is uncertain of allergies to Brimonidine but does have a recall of last eye drop Dr. Arben Valentine gave me burned my eyes possibly Wylene Fu. Will trial Combigan. Recheck IOP in 1 week

## 2021-04-13 ENCOUNTER — OFFICE VISIT (OUTPATIENT)
Dept: URBAN - METROPOLITAN AREA CLINIC 51 | Facility: CLINIC | Age: 51
End: 2021-04-13
Payer: COMMERCIAL

## 2021-04-13 PROCEDURE — 99213 OFFICE O/P EST LOW 20 MIN: CPT | Performed by: OPTOMETRIST

## 2021-04-13 ASSESSMENT — INTRAOCULAR PRESSURE
OD: 21
OS: 21

## 2021-04-13 NOTE — IMPRESSION/PLAN
Impression: Open angle with borderline findings, low risk, right eye *IOPs today 22mmHg OU with Combigan BID OD, with reactions noted on Monday night. *Hx of S/P Retinal sx OD w/ IOP rise, using med as preventive. *PT has SULFA ALLERGY/LUNG DZ *Tmax = 35 *OCT RNFL (6/10/14) OD: 92um; no thinning OS: 95um; no thinning *OCT RNFL (1/12/16): OD: 73um; thinning superior/nasal/inferior OS: 77um; thinning superior *OCT RNFL (1/10/17): OD: 90 um; borderline thinning inferior OS: 87um; borderline inf thinning *HVF 24-2 (12/4/14): OD: high FL 3/11xx with superior lens edge defect OS: high FL 4/11xx; scattered defects *HVF 24-2 (1/11/17): OD: superior lid/lens defect, stable OS: high FL 4/11xx; scattered binasal defect Plan: IOP is improving, but with adverse reactions. Pt thinks related to new med use. Pt to use ATs and switch to PF Cosopt (disp trial samples).  F/u in 1 week

## 2021-04-13 NOTE — IMPRESSION/PLAN
Impression: Tear film insufficiency of bilateral lacrimal glands: H04.123. Plan: Pt patched OD and used Ofloxacin last night to help with pain and irritation.  NO abrasion noted, reassured

## 2021-04-29 ENCOUNTER — OFFICE VISIT (OUTPATIENT)
Dept: URBAN - METROPOLITAN AREA CLINIC 51 | Facility: CLINIC | Age: 51
End: 2021-04-29
Payer: COMMERCIAL

## 2021-04-29 PROCEDURE — 99213 OFFICE O/P EST LOW 20 MIN: CPT | Performed by: OPTOMETRIST

## 2021-04-29 RX ORDER — DORZOLAMIDE HYDROCHLORIDE AND TIMOLOL MALEATE 20; 5 MG/ML; MG/ML
SOLUTION/ DROPS OPHTHALMIC
Qty: 190 | Refills: 3 | Status: INACTIVE
Start: 2021-04-29 | End: 2021-12-10

## 2021-04-29 ASSESSMENT — INTRAOCULAR PRESSURE
OD: 19
OS: 19

## 2021-04-29 NOTE — IMPRESSION/PLAN
Impression: Open angle with borderline findings, low risk, right eye *IOPs today 19mmHg OU with Cosopt 1gtt BID OU
*Hx of S/P Retinal sx OD w/ IOP rise, using med as preventive. *PT has SULFA ALLERGY/LUNG DZ *Tmax = 35 *OCT RNFL (6/10/14) OD: 92um; no thinning OS: 95um; no thinning *OCT RNFL (1/12/16): OD: 73um; thinning superior/nasal/inferior OS: 77um; thinning superior *OCT RNFL (1/10/17): OD: 90 um; borderline thinning inferior OS: 87um; borderline inf thinning *HVF 24-2 (12/4/14): OD: high FL 3/11xx with superior lens edge defect OS: high FL 4/11xx; scattered defects *HVF 24-2 (1/11/17): OD: superior lid/lens defect, stable OS: high FL 4/11xx; scattered binasal defect Plan: IOP is improving with Cosopt BID, she is only using OD. Pt to add Cosopt BID to OU. Pt to use ATs Recheck IOPs in 4 months

## 2021-08-24 ENCOUNTER — OFFICE VISIT (OUTPATIENT)
Dept: URBAN - METROPOLITAN AREA CLINIC 51 | Facility: CLINIC | Age: 51
End: 2021-08-24
Payer: COMMERCIAL

## 2021-08-24 DIAGNOSIS — H35.341 MACULAR CYST, HOLE, OR PSEUDOHOLE, RIGHT EYE: ICD-10-CM

## 2021-08-24 PROCEDURE — 99214 OFFICE O/P EST MOD 30 MIN: CPT | Performed by: OPTOMETRIST

## 2021-08-24 PROCEDURE — 92133 CPTRZD OPH DX IMG PST SGM ON: CPT | Performed by: OPTOMETRIST

## 2021-08-24 PROCEDURE — 92134 CPTRZ OPH DX IMG PST SGM RTA: CPT | Performed by: OPTOMETRIST

## 2021-08-24 ASSESSMENT — INTRAOCULAR PRESSURE
OD: 19
OS: 13

## 2021-08-24 NOTE — IMPRESSION/PLAN
Impression: Vitreomacular traction of left eye Plan: Discussed findings with patient. OCT Macular Scan completed, reviewed and documented. NEW lamellar hole noted with central metamorphopsia. Pt to get consult with Dr. Margarita Lugo for further management.

## 2021-08-24 NOTE — IMPRESSION/PLAN
Impression: Open angle with borderline findings, low risk, right eye *IOPs today 19mmHg OD and 13mmHg OS with Cosopt 1gtt BID OU
*Hx of S/P Retinal sx OD w/ IOP rise, using med as preventive. *PT has SULFA ALLERGY/LUNG DZ *Tmax = 35 *OCT RNFL (6/10/14) OD: 92um; no thinning OS: 95um; no thinning *OCT RNFL (1/12/16): OD: 73um; thinning superior/nasal/inferior OS: 77um; thinning superior *OCT RNFL (1/10/17): OD: 90 um; borderline thinning inferior OS: 87um; borderline inf thinning *HVF 24-2 (12/4/14): OD: high FL 3/11xx with superior lens edge defect OS: high FL 4/11xx; scattered defects *HVF 24-2 (1/11/17): OD: superior lid/lens defect, stable OS: high FL 4/11xx; scattered binasal defect Plan: IOP is improving with Cosopt BID, she is only using OU and wonders if she needs to continue for OS. Ok to d/c for OS and just BID for OD Recheck IOPs in 4 months

## 2021-08-24 NOTE — IMPRESSION/PLAN
Impression: Macular cyst, hole, or psuedohole, right eye Plan: s/p PPVit with hole repair. Hole closed, doing well with no recurrence of macular hole. Pt does notice central metamorphsia that is longstanding.

## 2021-08-31 ENCOUNTER — OFFICE VISIT (OUTPATIENT)
Dept: URBAN - METROPOLITAN AREA CLINIC 51 | Facility: CLINIC | Age: 51
End: 2021-08-31
Payer: COMMERCIAL

## 2021-08-31 PROCEDURE — 99213 OFFICE O/P EST LOW 20 MIN: CPT | Performed by: OPHTHALMOLOGY

## 2021-08-31 ASSESSMENT — INTRAOCULAR PRESSURE
OS: 14
OD: 17

## 2021-08-31 NOTE — IMPRESSION/PLAN
Impression: Lattice degeneration of retina, bilateral Plan: s/p extensive laser OU, no new breaks/lattice

## 2021-08-31 NOTE — IMPRESSION/PLAN
Impression: Puckering of macula, left eye Plan: stable trace ERM and CME and lamellar macular hole. OCT no change since 2017. no treatment indicated RTC PRN retina

## 2021-08-31 NOTE — IMPRESSION/PLAN
Impression: Macular cyst, hole, or pseudohole, right eye, OD Plan: hole closed s/p repair, doing well with no recurrence of macular hole. has PRK planned in near future, ok to proceed.

## 2021-12-03 ENCOUNTER — OFFICE VISIT (OUTPATIENT)
Dept: URBAN - METROPOLITAN AREA CLINIC 51 | Facility: CLINIC | Age: 51
End: 2021-12-03
Payer: COMMERCIAL

## 2021-12-03 PROCEDURE — 99214 OFFICE O/P EST MOD 30 MIN: CPT | Performed by: OPTOMETRIST

## 2021-12-03 ASSESSMENT — INTRAOCULAR PRESSURE
OD: 22
OS: 13

## 2021-12-03 NOTE — IMPRESSION/PLAN
Impression: Open angle with borderline findings, low risk, right eye *IOPs today 22mmHg OD and 13mmHg OS with Cosopt 1gtt BID OU
*Hx of S/P Retinal sx OD w/ IOP rise, using med as preventive. *PT has SULFA ALLERGY/LUNG DZ *Tmax = 35 *OCT RNFL (6/10/14) OD: 92um; no thinning OS: 95um; no thinning *OCT RNFL (1/12/16): OD: 73um; thinning superior/nasal/inferior OS: 77um; thinning superior *OCT RNFL (1/10/17): OD: 90 um; borderline thinning inferior OS: 87um; borderline inf thinning *HVF 24-2 (12/4/14): OD: high FL 3/11xx with superior lens edge defect OS: high FL 4/11xx; scattered defects *HVF 24-2 (1/11/17): OD: superior lid/lens defect, stable OS: high FL 4/11xx; scattered binasal defect Plan: The glaucoma seems to be managed well with current gtt regimen. I have reviewed with the patient to continue with current regimen. Refill of medications asked and a electronic Rx was sent to the patient's pharmacy of choice.

## 2021-12-10 ENCOUNTER — OFFICE VISIT (OUTPATIENT)
Dept: URBAN - METROPOLITAN AREA CLINIC 51 | Facility: CLINIC | Age: 51
End: 2021-12-10
Payer: COMMERCIAL

## 2021-12-10 DIAGNOSIS — H16.142 PUNCTATE KERATITIS, LEFT EYE: Primary | ICD-10-CM

## 2021-12-10 PROCEDURE — 99214 OFFICE O/P EST MOD 30 MIN: CPT | Performed by: OPTOMETRIST

## 2021-12-10 RX ORDER — DORZOLAMIDE HYDROCHLORIDE AND TIMOLOL MALEATE 20; 5 MG/ML; MG/ML
SOLUTION/ DROPS OPHTHALMIC
Qty: 190 | Refills: 3 | Status: ACTIVE
Start: 2021-12-10

## 2021-12-10 ASSESSMENT — INTRAOCULAR PRESSURE
OD: 17
OS: 16

## 2021-12-10 NOTE — IMPRESSION/PLAN
Impression: Punctate keratitis, left eye: H16.142. Plan: New onset that has patient aware of irritation. She reports hitting eye with tip of vial at times. 
START Ofloxacin 1gtt QID OS, cont with ATs QID or more OU
f/u on Friday

## 2021-12-10 NOTE — IMPRESSION/PLAN
Impression: Open angle with borderline findings, low risk, right eye *IOPs today 17mmHg OD and 16mmHg OS with Cosopt 1gtt BID OU
*Hx of S/P Retinal sx OD w/ IOP rise, using med as preventive. *PT has SULFA ALLERGY/LUNG DZ *Tmax = 35 *OCT RNFL (6/10/14) OD: 92um; no thinning OS: 95um; no thinning *OCT RNFL (1/12/16): OD: 73um; thinning superior/nasal/inferior OS: 77um; thinning superior *OCT RNFL (1/10/17): OD: 90 um; borderline thinning inferior OS: 87um; borderline inf thinning *HVF 24-2 (12/4/14): OD: high FL 3/11xx with superior lens edge defect OS: high FL 4/11xx; scattered defects *HVF 24-2 (1/11/17): OD: superior lid/lens defect, stable OS: high FL 4/11xx; scattered binasal defect Plan: IOP seems to be managed well with current gtt regimen. I have reviewed with the patient to continue with current regimen. Refill of medications asked and a electronic Rx was sent to the patient's pharmacy of choice. RTC 4 mo. for IOP check.

## 2022-04-06 ENCOUNTER — OFFICE VISIT (OUTPATIENT)
Dept: URBAN - METROPOLITAN AREA CLINIC 56 | Facility: CLINIC | Age: 52
End: 2022-04-06
Payer: COMMERCIAL

## 2022-04-06 DIAGNOSIS — S01.111A: Primary | ICD-10-CM

## 2022-04-06 PROCEDURE — 99214 OFFICE O/P EST MOD 30 MIN: CPT | Performed by: STUDENT IN AN ORGANIZED HEALTH CARE EDUCATION/TRAINING PROGRAM

## 2022-04-06 ASSESSMENT — INTRAOCULAR PRESSURE
OS: 16
OD: 18

## 2022-04-06 NOTE — IMPRESSION/PLAN
Impression: Laceration without FB of right eyelid, initial encounter: S01.111A. Plan: 2' to dog scratch yesterday. No corneal or punctal involvement, no infection. Start erythromycin ilene 2-3x/day, OK to continue ice PRN. Keep area clean. No need for stitches. If swelling increases or discharge, may need oral abx but not indicated at this time. Call if symptoms worsen.

## 2022-04-21 ENCOUNTER — OFFICE VISIT (OUTPATIENT)
Dept: URBAN - METROPOLITAN AREA CLINIC 51 | Facility: CLINIC | Age: 52
End: 2022-04-21
Payer: COMMERCIAL

## 2022-04-21 DIAGNOSIS — H40.011 OPEN ANGLE WITH BORDERLINE FINDINGS, LOW RISK, RIGHT EYE: Primary | ICD-10-CM

## 2022-04-21 PROCEDURE — 99213 OFFICE O/P EST LOW 20 MIN: CPT | Performed by: OPTOMETRIST

## 2022-04-21 PROCEDURE — 92133 CPTRZD OPH DX IMG PST SGM ON: CPT | Performed by: OPTOMETRIST

## 2022-04-21 RX ORDER — DOXYCYCLINE HYCLATE 50 MG/1
50 MG CAPSULE, GELATIN COATED ORAL
Qty: 190 | Refills: 3 | Status: ACTIVE
Start: 2022-04-21

## 2022-04-21 RX ORDER — DORZOLAMIDE HYDROCHLORIDE AND TIMOLOL MALEATE 20; 5 MG/ML; MG/ML
SOLUTION/ DROPS OPHTHALMIC
Qty: 190 | Refills: 3 | Status: INACTIVE
Start: 2022-04-21 | End: 2022-04-21

## 2022-04-21 RX ORDER — DORZOLAMIDE HYDROCHLORIDE AND TIMOLOL MALEATE 20; 5 MG/ML; MG/ML
SOLUTION/ DROPS OPHTHALMIC
Qty: 190 | Refills: 3 | Status: ACTIVE
Start: 2022-04-21

## 2022-04-21 ASSESSMENT — INTRAOCULAR PRESSURE
OD: 20
OS: 20

## 2022-04-21 NOTE — IMPRESSION/PLAN
Impression: Open angle with borderline findings, low risk, right eye *IOPs today 20mmHg OD and 20mmHg OS with Cosopt 1gtt BID OD
*Hx of S/P Retinal sx OD w/ IOP rise, using med as preventive. *PT has SULFA ALLERGY/LUNG DZ *Tmax = 35 *OCT RNFL (4/21/22) OD: 85um, thinning sup and inf OS: 83um, thinning sup *HVF 24-2 (12/4/14): OD: high FL 3/11xx with superior lens edge defect OS: high FL 4/11xx; scattered defects *HVF 24-2 (1/11/17): OD: superior lid/lens defect, stable OS: high FL 4/11xx; scattered binasal defect Plan: The glaucoma seems to be managed well with current gtt regimen. I have reviewed with the patient to continue with current regimen. Refill of medications asked and a electronic Rx was sent to the patient's pharmacy of choice. 
RTC in 3 months for IOP check with Dr. Allyssa Leonard

## 2022-08-23 ENCOUNTER — OFFICE VISIT (OUTPATIENT)
Dept: URBAN - METROPOLITAN AREA CLINIC 51 | Facility: CLINIC | Age: 52
End: 2022-08-23
Payer: COMMERCIAL

## 2022-08-23 DIAGNOSIS — H40.011 OPEN ANGLE WITH BORDERLINE FINDINGS, LOW RISK, RIGHT EYE: Primary | ICD-10-CM

## 2022-08-23 PROCEDURE — 99213 OFFICE O/P EST LOW 20 MIN: CPT | Performed by: OPTOMETRIST

## 2022-08-23 RX ORDER — DORZOLAMIDE HYDROCHLORIDE AND TIMOLOL MALEATE 20; 5 MG/ML; MG/ML
SOLUTION/ DROPS OPHTHALMIC
Qty: 190 | Refills: 3 | Status: ACTIVE
Start: 2022-08-23

## 2022-08-23 RX ORDER — DOXYCYCLINE HYCLATE 50 MG/1
50 MG CAPSULE, GELATIN COATED ORAL
Qty: 190 | Refills: 3 | Status: ACTIVE
Start: 2022-08-23

## 2022-08-23 RX ORDER — DOXYCYCLINE HYCLATE 50 MG/1
50 MG CAPSULE, GELATIN COATED ORAL
Qty: 190 | Refills: 3 | Status: INACTIVE
Start: 2022-08-23 | End: 2022-08-23

## 2022-08-23 ASSESSMENT — INTRAOCULAR PRESSURE
OD: 19
OS: 19

## 2022-08-23 NOTE — IMPRESSION/PLAN
Impression: Open angle with borderline findings, low risk, right eye *IOPs today 19mmHg OD and 19mmHg OS with Cosopt 1gtt BID OD
*Hx of S/P Retinal sx OD w/ IOP rise, using med as preventive. *PT has SULFA ALLERGY/LUNG DZ *Tmax = 35 *OCT RNFL (4/21/22) OD: 85um, thinning sup and inf OS: 83um, thinning sup *HVF 24-2 (12/4/14): OD: high FL 3/11xx with superior lens edge defect OS: high FL 4/11xx; scattered defects *HVF 24-2 (1/11/17): OD: superior lid/lens defect, stable OS: high FL 4/11xx; scattered binasal defect Plan: The glaucoma seems to be managed well with current gtt regimen. I have reviewed with the patient to continue with current regimen. Refill of medications asked and a electronic Rx was sent to the patient's pharmacy of choice. 
RTC in [place], OCT RNFL and HVF 24-2 with Dr. Laureen Damico

## 2022-10-04 ENCOUNTER — OFFICE VISIT (OUTPATIENT)
Dept: URBAN - METROPOLITAN AREA CLINIC 51 | Facility: CLINIC | Age: 52
End: 2022-10-04
Payer: COMMERCIAL

## 2022-10-04 DIAGNOSIS — H04.123 TEAR FILM INSUFFICIENCY OF BILATERAL LACRIMAL GLANDS: ICD-10-CM

## 2022-10-04 DIAGNOSIS — H40.011 OPEN ANGLE WITH BORDERLINE FINDINGS, LOW RISK, RIGHT EYE: Primary | ICD-10-CM

## 2022-10-04 DIAGNOSIS — H35.413 LATTICE DEGENERATION OF RETINA, BILATERAL: ICD-10-CM

## 2022-10-04 DIAGNOSIS — H43.822 VITREOMACULAR ADHESION, LEFT EYE: ICD-10-CM

## 2022-10-04 DIAGNOSIS — H52.4 PRESBYOPIA: ICD-10-CM

## 2022-10-04 DIAGNOSIS — H35.341 MACULAR CYST, HOLE, OR PSEUDOHOLE, RIGHT EYE: ICD-10-CM

## 2022-10-04 DIAGNOSIS — H43.813 VITREOUS DEGENERATION, BILATERAL: ICD-10-CM

## 2022-10-04 PROCEDURE — 92133 CPTRZD OPH DX IMG PST SGM ON: CPT | Performed by: OPTOMETRIST

## 2022-10-04 PROCEDURE — 92083 EXTENDED VISUAL FIELD XM: CPT | Performed by: OPTOMETRIST

## 2022-10-04 PROCEDURE — 92134 CPTRZ OPH DX IMG PST SGM RTA: CPT | Performed by: OPTOMETRIST

## 2022-10-04 PROCEDURE — 99214 OFFICE O/P EST MOD 30 MIN: CPT | Performed by: OPTOMETRIST

## 2022-10-04 ASSESSMENT — VISUAL ACUITY
OD: 20/40
OS: 20/20

## 2022-10-04 ASSESSMENT — INTRAOCULAR PRESSURE
OD: 24
OS: 16

## 2022-10-04 NOTE — IMPRESSION/PLAN
Impression: Lattice degeneration of retina, bilateral
*s/p extensive laser OU, Plan: Warning signs of retinal tear and detachment discussed with patient. To return to clinic STAT if any change in symptoms consistent with RT or RD.

## 2022-10-04 NOTE — IMPRESSION/PLAN
Impression: Tear film insufficiency of bilateral lacrimal glands: H04.123. Plan: Pt to continue with Doxycycline BID PO and ATs QID OU.

## 2022-10-04 NOTE — IMPRESSION/PLAN
Impression: Vitreomacular adhesion, left eye: H43.822. Plan: OCT Macular Scan completed, reviewed and documented. Stable, no changes. OCT MAC yearly.

## 2022-10-04 NOTE — IMPRESSION/PLAN
Impression: Open angle with borderline findings, low risk, right eye *IOPs today 24mmHg OD and 16mmHg OS with Cosopt 1gtt BID OD
*Hx of S/P Retinal sx OD w/ IOP rise, using med as preventive. *PT has SULFA ALLERGY/LUNG DZ *Tmax = 35 *OCT RNFL (10/04/2022) OD: 75um ; abnormal sup/inf thinning OS: 80um ; borderline sup thinning *OCT RNFL (4/21/22) OD: 85um, thinning sup and inf OS: 83um, thinning sup *HVF 24-2 (10/04/2022) OD: scattered defects OS: scattered defects *HVF 24-2 (1/11/17): OD: superior lid/lens defect, stable OS: high FL 4/11xx; scattered binasal defect Plan: IOP is elevated OD with reports of good adherence. I have reviewed with the patient to continue with current regimen. Pt has enough refills. Recheck IOPs in 3 month. If IOP is hjgher than 22mmHg OD, consider adding or switching med.

## 2022-10-04 NOTE — IMPRESSION/PLAN
Impression: Macular cyst, hole, or pseudohole, right eye, OD *hole closed s/p repair OD Plan: OCT Macular Scan completed, reviewed and documented. Stable, no changes. Check annually or sooner if patient notices distortions or if there is a decline in vision.

## 2022-12-15 ENCOUNTER — OFFICE VISIT (OUTPATIENT)
Dept: URBAN - METROPOLITAN AREA CLINIC 56 | Facility: LOCATION | Age: 52
End: 2022-12-15
Payer: COMMERCIAL

## 2022-12-15 DIAGNOSIS — S05.00XA CORNEAL ABRASION W/O FB OF EYE, INITIAL ENCOUNTER: Primary | ICD-10-CM

## 2022-12-15 PROCEDURE — 99214 OFFICE O/P EST MOD 30 MIN: CPT | Performed by: STUDENT IN AN ORGANIZED HEALTH CARE EDUCATION/TRAINING PROGRAM

## 2022-12-15 RX ORDER — ERYTHROMYCIN 5 MG/G
OINTMENT OPHTHALMIC
Qty: 5 | Refills: 0 | Status: ACTIVE
Start: 2022-12-15

## 2022-12-15 ASSESSMENT — INTRAOCULAR PRESSURE
OS: 15
OD: 19

## 2022-12-15 NOTE — IMPRESSION/PLAN
Impression: Corneal abrasion w/o FB of eye, initial encounter: S05.00XA. Plan: hit cornea with AT vial last night. Abrasion <1mm, recommend frequent PFATs and erythromycin ilene QHS. Call if symptoms worsen.

## 2023-02-14 ENCOUNTER — OFFICE VISIT (OUTPATIENT)
Dept: URBAN - METROPOLITAN AREA CLINIC 51 | Facility: CLINIC | Age: 53
End: 2023-02-14
Payer: COMMERCIAL

## 2023-02-14 DIAGNOSIS — H40.011 OPEN ANGLE WITH BORDERLINE FINDINGS, LOW RISK, RIGHT EYE: Primary | ICD-10-CM

## 2023-02-14 DIAGNOSIS — H35.341 MACULAR CYST, HOLE, OR PSEUDOHOLE, RIGHT EYE: ICD-10-CM

## 2023-02-14 PROCEDURE — 92133 CPTRZD OPH DX IMG PST SGM ON: CPT | Performed by: OPTOMETRIST

## 2023-02-14 PROCEDURE — 92134 CPTRZ OPH DX IMG PST SGM RTA: CPT | Performed by: OPTOMETRIST

## 2023-02-14 PROCEDURE — 99214 OFFICE O/P EST MOD 30 MIN: CPT | Performed by: OPTOMETRIST

## 2023-02-14 RX ORDER — DORZOLAMIDE HYDROCHLORIDE AND TIMOLOL MALEATE 20; 5 MG/ML; MG/ML
SOLUTION/ DROPS OPHTHALMIC
Qty: 180 | Refills: 3 | Status: ACTIVE
Start: 2023-02-14

## 2023-02-14 RX ORDER — DOXYCYCLINE HYCLATE 50 MG/1
50 MG CAPSULE, GELATIN COATED ORAL
Qty: 190 | Refills: 3 | Status: ACTIVE
Start: 2023-02-14

## 2023-02-14 RX ORDER — DORZOLAMIDE HYDROCHLORIDE AND TIMOLOL MALEATE 20; 5 MG/ML; MG/ML
SOLUTION/ DROPS OPHTHALMIC
Qty: 190 | Refills: 3 | Status: INACTIVE
Start: 2023-02-14 | End: 2023-02-14

## 2023-02-14 ASSESSMENT — INTRAOCULAR PRESSURE
OS: 13
OD: 18

## 2023-02-14 NOTE — IMPRESSION/PLAN
Impression: Open angle with borderline findings, low risk, right eye *IOPs today 18mmHg OD and 13mmHg OS with Cosopt 1gtt BID OD
*Hx of S/P Retinal sx OD w/ IOP rise, using med as preventive. *PT has SULFA ALLERGY/LUNG DZ *Tmax = 35 *OCT RNFL (02/17/2023) OD: 80um ; abnormal inf/sup thinning OS: 88um ; borderline sup thinning *OCT RNFL (10/04/2022) OD: 75um ; abnormal sup/inf thinning OS: 80um ; borderline sup thinning *OCT RNFL (4/21/22) OD: 85um, thinning sup and inf OS: 83um, thinning sup *HVF 24-2 (10/04/2022) OD: scattered defects OS: scattered defects *HVF 24-2 (1/11/17): OD: superior lid/lens defect, stable OS: high FL 4/11xx; scattered binasal defect Plan: The glaucoma seems to be managed well with current gtt regimen. I have reviewed with the patient to continue with current regimen. Refill of medications asked and a electronic Rx was sent to the patient's pharmacy of choice.  RTC in 4 months for IOP check

## 2023-06-13 ENCOUNTER — OFFICE VISIT (OUTPATIENT)
Dept: URBAN - METROPOLITAN AREA CLINIC 51 | Facility: CLINIC | Age: 53
End: 2023-06-13
Payer: COMMERCIAL

## 2023-06-13 DIAGNOSIS — H40.011 OPEN ANGLE WITH BORDERLINE FINDINGS, LOW RISK, RIGHT EYE: Primary | ICD-10-CM

## 2023-06-13 DIAGNOSIS — H35.341 MACULAR CYST, HOLE, OR PSEUDOHOLE, RIGHT EYE: ICD-10-CM

## 2023-06-13 PROCEDURE — 99213 OFFICE O/P EST LOW 20 MIN: CPT | Performed by: OPTOMETRIST

## 2023-06-13 PROCEDURE — 92134 CPTRZ OPH DX IMG PST SGM RTA: CPT | Performed by: OPTOMETRIST

## 2023-06-13 RX ORDER — DORZOLAMIDE HYDROCHLORIDE AND TIMOLOL MALEATE 20; 5 MG/ML; MG/ML
SOLUTION/ DROPS OPHTHALMIC
Qty: 180 | Refills: 3 | Status: ACTIVE
Start: 2023-06-13

## 2023-06-13 ASSESSMENT — INTRAOCULAR PRESSURE
OS: 12
OD: 15
OS: 14
OD: 18

## 2023-06-13 NOTE — IMPRESSION/PLAN
Impression: Open angle with borderline findings, low risk, right eye *IOPs today 18mmHg OD and 14mmHg OS with Cosopt 1gtt BID OD
*Hx of S/P Retinal sx OD w/ IOP rise, using med as preventive. *PT has SULFA ALLERGY/LUNG DZ *Tmax = 35 *OCT RNFL (02/17/2023) OD: 80um ; abnormal inf/sup thinning OS: 88um ; borderline sup thinning *OCT RNFL (10/04/2022) OD: 75um ; abnormal sup/inf thinning OS: 80um ; borderline sup thinning *OCT RNFL (4/21/22) OD: 85um, thinning sup and inf OS: 83um, thinning sup *HVF 24-2 (10/04/2022) OD: scattered defects OS: scattered defects *HVF 24-2 (1/11/17): OD: superior lid/lens defect, stable OS: high FL 4/11xx; scattered binasal defect Plan: The glaucoma seems to be managed well with current gtt regimen. I have reviewed with the patient to continue with current regimen. Refill of medications asked and a electronic Rx was sent to the patient's pharmacy of choice. 
Pt to RTC November for complete eye exam with Dr. Allyssa Leonard

## 2024-01-23 ENCOUNTER — OFFICE VISIT (OUTPATIENT)
Dept: URBAN - METROPOLITAN AREA CLINIC 51 | Facility: CLINIC | Age: 54
End: 2024-01-23
Payer: COMMERCIAL

## 2024-01-23 DIAGNOSIS — H43.813 VITREOUS DEGENERATION, BILATERAL: ICD-10-CM

## 2024-01-23 DIAGNOSIS — L71.8 OTHER ROSACEA: ICD-10-CM

## 2024-01-23 DIAGNOSIS — H35.341 MACULAR CYST, HOLE, OR PSEUDOHOLE, RIGHT EYE: ICD-10-CM

## 2024-01-23 DIAGNOSIS — Z96.1 PRESENCE OF INTRAOCULAR LENS: ICD-10-CM

## 2024-01-23 DIAGNOSIS — H40.011 OPEN ANGLE WITH BORDERLINE FINDINGS, LOW RISK, RIGHT EYE: Primary | ICD-10-CM

## 2024-01-23 DIAGNOSIS — H52.4 PRESBYOPIA: ICD-10-CM

## 2024-01-23 DIAGNOSIS — H35.413 LATTICE DEGENERATION OF RETINA, BILATERAL: ICD-10-CM

## 2024-01-23 PROCEDURE — 99214 OFFICE O/P EST MOD 30 MIN: CPT | Performed by: OPTOMETRIST

## 2024-01-23 PROCEDURE — 92134 CPTRZ OPH DX IMG PST SGM RTA: CPT | Performed by: OPTOMETRIST

## 2024-01-23 PROCEDURE — 92083 EXTENDED VISUAL FIELD XM: CPT | Performed by: OPTOMETRIST

## 2024-01-23 ASSESSMENT — INTRAOCULAR PRESSURE
OS: 17
OD: 18

## 2024-01-23 ASSESSMENT — KERATOMETRY
OD: 41.75
OS: 45.00

## 2024-01-23 ASSESSMENT — VISUAL ACUITY
OS: 20/20
OD: 20/40

## 2024-06-21 ENCOUNTER — OFFICE VISIT (OUTPATIENT)
Dept: URBAN - METROPOLITAN AREA CLINIC 51 | Facility: CLINIC | Age: 54
End: 2024-06-21
Payer: COMMERCIAL

## 2024-06-21 DIAGNOSIS — H52.4 PRESBYOPIA: ICD-10-CM

## 2024-06-21 DIAGNOSIS — L71.8 OTHER ROSACEA: Primary | ICD-10-CM

## 2024-06-21 DIAGNOSIS — H04.322 ACUTE DACRYOCYSTITIS OF LEFT LACRIMAL PASSAGE: ICD-10-CM

## 2024-06-21 DIAGNOSIS — H40.011 OPEN ANGLE WITH BORDERLINE FINDINGS, LOW RISK, RIGHT EYE: ICD-10-CM

## 2024-06-21 PROCEDURE — 99214 OFFICE O/P EST MOD 30 MIN: CPT | Performed by: OPTOMETRIST

## 2024-06-21 RX ORDER — AZITHROMYCIN MONOHYDRATE 250 MG/1
250 MG TABLET, FILM COATED ORAL
Qty: 5 | Refills: 1 | Status: INACTIVE
Start: 2024-06-21 | End: 2024-06-21

## 2024-06-21 RX ORDER — AZITHROMYCIN MONOHYDRATE 250 MG/1
250 MG TABLET, FILM COATED ORAL
Qty: 12 | Refills: 1 | Status: INACTIVE
Start: 2024-06-21 | End: 2024-06-25

## 2024-06-21 RX ORDER — ERYTHROMYCIN 5 MG/G
OINTMENT OPHTHALMIC
Qty: 5 | Refills: 0 | Status: INACTIVE
Start: 2024-06-21 | End: 2024-06-21

## 2024-06-21 ASSESSMENT — INTRAOCULAR PRESSURE
OS: 21
OD: 24
OS: 17
OD: 29

## 2024-06-21 ASSESSMENT — VISUAL ACUITY: OD: 20/40

## 2024-06-28 ENCOUNTER — OFFICE VISIT (OUTPATIENT)
Dept: URBAN - METROPOLITAN AREA CLINIC 51 | Facility: CLINIC | Age: 54
End: 2024-06-28
Payer: COMMERCIAL

## 2024-06-28 DIAGNOSIS — H04.322 ACUTE DACRYOCYSTITIS OF LEFT LACRIMAL PASSAGE: Primary | ICD-10-CM

## 2024-06-28 PROCEDURE — 99212 OFFICE O/P EST SF 10 MIN: CPT | Performed by: OPTOMETRIST

## 2024-06-28 ASSESSMENT — INTRAOCULAR PRESSURE
OD: 23
OS: 18

## 2024-08-17 ENCOUNTER — OFFICE VISIT (OUTPATIENT)
Dept: URBAN - METROPOLITAN AREA CLINIC 51 | Facility: CLINIC | Age: 54
End: 2024-08-17
Payer: COMMERCIAL

## 2024-08-17 DIAGNOSIS — H43.813 VITREOUS DEGENERATION, BILATERAL: ICD-10-CM

## 2024-08-17 DIAGNOSIS — H35.372 PUCKERING OF MACULA, LEFT EYE: ICD-10-CM

## 2024-08-17 DIAGNOSIS — H40.011 OPEN ANGLE WITH BORDERLINE FINDINGS, LOW RISK, RIGHT EYE: ICD-10-CM

## 2024-08-17 DIAGNOSIS — L71.8 OTHER ROSACEA: Primary | ICD-10-CM

## 2024-08-17 DIAGNOSIS — H52.4 PRESBYOPIA: ICD-10-CM

## 2024-08-17 DIAGNOSIS — Z96.1 PRESENCE OF INTRAOCULAR LENS: ICD-10-CM

## 2024-08-17 DIAGNOSIS — H35.341 MACULAR CYST, HOLE, OR PSEUDOHOLE, RIGHT EYE: ICD-10-CM

## 2024-08-17 PROCEDURE — 92014 COMPRE OPH EXAM EST PT 1/>: CPT | Performed by: OPTOMETRIST

## 2024-08-17 PROCEDURE — 92134 CPTRZ OPH DX IMG PST SGM RTA: CPT | Performed by: OPTOMETRIST

## 2024-08-17 PROCEDURE — 92133 CPTRZD OPH DX IMG PST SGM ON: CPT | Performed by: OPTOMETRIST

## 2024-08-17 RX ORDER — DORZOLAMIDE HYDROCHLORIDE AND TIMOLOL MALEATE 20; 5 MG/ML; MG/ML
SOLUTION/ DROPS OPHTHALMIC
Qty: 180 | Refills: 3 | Status: ACTIVE
Start: 2024-08-17

## 2024-08-17 ASSESSMENT — VISUAL ACUITY
OD: 20/30
OS: 20/20

## 2024-08-17 ASSESSMENT — INTRAOCULAR PRESSURE
OD: 16
OS: 19

## 2024-08-17 ASSESSMENT — KERATOMETRY
OS: 44.75
OD: 41.88

## 2024-12-20 ENCOUNTER — OFFICE VISIT (OUTPATIENT)
Dept: URBAN - METROPOLITAN AREA CLINIC 51 | Facility: CLINIC | Age: 54
End: 2024-12-20
Payer: COMMERCIAL

## 2024-12-20 DIAGNOSIS — H35.372 PUCKERING OF MACULA, LEFT EYE: ICD-10-CM

## 2024-12-20 DIAGNOSIS — H40.011 OPEN ANGLE WITH BORDERLINE FINDINGS, LOW RISK, RIGHT EYE: Primary | ICD-10-CM

## 2024-12-20 PROCEDURE — 92083 EXTENDED VISUAL FIELD XM: CPT | Performed by: OPTOMETRIST

## 2024-12-20 PROCEDURE — 99213 OFFICE O/P EST LOW 20 MIN: CPT | Performed by: OPTOMETRIST

## 2024-12-20 RX ORDER — DORZOLAMIDE HYDROCHLORIDE AND TIMOLOL MALEATE 20; 5 MG/ML; MG/ML
SOLUTION/ DROPS OPHTHALMIC
Qty: 180 | Refills: 3 | Status: ACTIVE
Start: 2024-12-20

## 2024-12-20 ASSESSMENT — INTRAOCULAR PRESSURE
OS: 11
OD: 12
OS: 17
OD: 20

## 2025-06-27 ENCOUNTER — OFFICE VISIT (OUTPATIENT)
Dept: URBAN - METROPOLITAN AREA CLINIC 51 | Facility: CLINIC | Age: 55
End: 2025-06-27
Payer: COMMERCIAL

## 2025-06-27 DIAGNOSIS — H35.372 PUCKERING OF MACULA, LEFT EYE: ICD-10-CM

## 2025-06-27 DIAGNOSIS — H40.011 OPEN ANGLE WITH BORDERLINE FINDINGS, LOW RISK, RIGHT EYE: Primary | ICD-10-CM

## 2025-06-27 PROCEDURE — 99213 OFFICE O/P EST LOW 20 MIN: CPT | Performed by: OPTOMETRIST

## 2025-06-27 ASSESSMENT — INTRAOCULAR PRESSURE
OS: 17
OD: 18
OS: 15
OD: 20